# Patient Record
Sex: MALE | Race: OTHER | HISPANIC OR LATINO | Employment: UNEMPLOYED | URBAN - METROPOLITAN AREA
[De-identification: names, ages, dates, MRNs, and addresses within clinical notes are randomized per-mention and may not be internally consistent; named-entity substitution may affect disease eponyms.]

---
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
## 2024-01-03 DIAGNOSIS — J30.9 ALLERGIC RHINITIS, UNSPECIFIED SEASONALITY, UNSPECIFIED TRIGGER: ICD-10-CM

## 2024-01-04 RX ORDER — CETIRIZINE HYDROCHLORIDE 10 MG/1
10 TABLET ORAL DAILY
Qty: 30 TABLET | Refills: 0 | Status: SHIPPED | OUTPATIENT
Start: 2024-01-04 | End: 2024-02-08

## 2025-06-27 NOTE — PROGRESS NOTES
Please call patient's family to notify of elevated cholesterol, notify that have placed an endocrine referral and they should schedule an appt with endo, and schedule the appt with nutrition as we discussed in the well visit
Patent

## (undated) DEVICE — KIT ANTIFOG

## (undated) DEVICE — PACK TONSIL CUSTOM

## (undated) DEVICE — SPONGE TONSIL MEDIUM

## (undated) DEVICE — CATH RED RUBBER 8FR

## (undated) DEVICE — ELECTRODE BLADE INSULATED 1 IN

## (undated) DEVICE — SUCTION COAGULATOR 10FR 6IN

## (undated) DEVICE — ELECTRODE REM PLYHSV RETURN 9

## (undated) DEVICE — BLADE RED 40 ADENOID

## (undated) DEVICE — SEE MEDLINE ITEM 152496